# Patient Record
Sex: FEMALE | Race: WHITE | Employment: FULL TIME | URBAN - METROPOLITAN AREA
[De-identification: names, ages, dates, MRNs, and addresses within clinical notes are randomized per-mention and may not be internally consistent; named-entity substitution may affect disease eponyms.]

---

## 2017-12-14 RX ORDER — MEDROXYPROGESTERONE ACETATE 10 MG/1
TABLET ORAL
Qty: 28 TAB | Refills: 11 | Status: SHIPPED | OUTPATIENT
Start: 2017-12-14 | End: 2017-12-15 | Stop reason: SDUPTHER

## 2017-12-16 RX ORDER — MEDROXYPROGESTERONE ACETATE 10 MG/1
TABLET ORAL
Qty: 28 TAB | Refills: 11 | Status: SHIPPED | OUTPATIENT
Start: 2017-12-16

## 2023-02-28 ENCOUNTER — OFFICE VISIT (OUTPATIENT)
Dept: OBGYN CLINIC | Age: 38
End: 2023-02-28
Payer: COMMERCIAL

## 2023-02-28 VITALS
DIASTOLIC BLOOD PRESSURE: 81 MMHG | SYSTOLIC BLOOD PRESSURE: 117 MMHG | BODY MASS INDEX: 22.57 KG/M2 | WEIGHT: 127.4 LBS | HEIGHT: 63 IN

## 2023-02-28 DIAGNOSIS — N92.0 MENORRHAGIA WITH REGULAR CYCLE: ICD-10-CM

## 2023-02-28 DIAGNOSIS — N63.22 BREAST LUMP ON LEFT SIDE AT 10 O'CLOCK POSITION: Primary | ICD-10-CM

## 2023-02-28 DIAGNOSIS — N80.9 ENDOMETRIOSIS: ICD-10-CM

## 2023-02-28 PROCEDURE — 99203 OFFICE O/P NEW LOW 30 MIN: CPT | Performed by: OBSTETRICS & GYNECOLOGY

## 2023-02-28 RX ORDER — RELUGOLIX, ESTRADIOL HEMIHYDRATE, AND NORETHINDRONE ACETATE 40; 1; .5 MG/1; MG/1; MG/1
1 TABLET, FILM COATED ORAL DAILY
Qty: 30 EACH | Refills: 5 | Status: SHIPPED | OUTPATIENT
Start: 2023-02-28 | End: 2023-03-30

## 2023-02-28 RX ORDER — ALBUTEROL SULFATE 90 UG/1
AEROSOL, METERED RESPIRATORY (INHALATION)
COMMUNITY
Start: 2022-11-28

## 2023-02-28 NOTE — PROGRESS NOTES
Isaac Ramirez is a 40 y.o. female presents for a problem visit. Chief Complaint   Patient presents with    Breast Mass     Patient's last menstrual period was 02/13/2023. Birth Control: none. Last Pap: normal obtained 2021. The patient is reporting having: left Breast Mass for 1  week. She reports the symptoms are has worsened. 1. Have you been to the ER, urgent care clinic, or hospitalized since your last visit? N/A NP    2. Have you seen or consulted any other health care providers outside of the [address] [place] [address] since your last visit?   N/A NP     Examination chaperoned by Joselin Henry MA.

## 2023-02-28 NOTE — PROGRESS NOTES
Breast Lump Evaluation    Rosario Leon is a ,  40 y.o. female  whose Patient's last menstrual period was 2023. Alon Mcneil Reports a 1 week history of a left breast lump. She states it has been painful but is feeling better over past day or so. No recent injury. No nipple discharge. She has never had a mammogram.   No family history of breast cancer. Her other complaint is pelvic back and sciatica type pain she relates to endometriosis. She has had very heavy periods over years. She does have dyspareunia. Previous History:  Past Medical History:   Diagnosis Date    Eclampsia     Endometriosis 2011    Fibrocystic breast changes     Heavy periods     Pelvic adhesions      Past Surgical History:   Procedure Laterality Date    HX APPENDECTOMY      HX  SECTION  2005    Stat for pre eclampsia ? scar    HX  SECTION  2011    HX ORTHOPAEDIC      Cyst removed from hand    HX PELVIC LAPAROSCOPY      Endometriosis     Social History     Occupational History    Occupation:    Tobacco Use    Smoking status: Every Day     Packs/day: 0.25     Years: 10.00     Pack years: 2.50     Types: Cigarettes    Smokeless tobacco: Never   Substance and Sexual Activity    Alcohol use: Yes     Comment: Occasional    Drug use: No    Sexual activity: Yes     Partners: Male     Birth control/protection: Surgical     Comment: Vasectomy     Family History   Problem Relation Age of Onset    Diabetes Other     Hypertension Other     Prostate Cancer Father        Allergies   Allergen Reactions    Carbatrol [Carbamazepine] Hives    Phenytoin Sodium Extended Other (comments)     Bleeding gums, severe adverse side effects. Other reaction(s): Other (See Comments)  Raises blood pressure       Prior to Admission medications    Medication Sig Start Date End Date Taking?  Authorizing Provider   albuterol (PROVENTIL HFA, VENTOLIN HFA, PROAIR HFA) 90 mcg/actuation inhaler TAKE 2 PUFFS BY MOUTH EVERY 4 TO 6 HOURS AS NEEDED FOR BREATHING 11/28/22  Yes Provider, Historical        Review of Systems: History obtained from the patient  Constitutional: negative for weight loss, fever, night sweats  HEENT: negative for hearing loss, earache, congestion, snoring, sorethroat  CV: negative for chest pain, palpitations, edema  Resp: negative for cough, shortness of breath, wheezing  Breast: see above  GI: negative for change in bowel habits, abdominal pain, black or bloody stools  : negative for frequency, dysuria, hematuria, vaginal discharge  MSK: negative for back pain, joint pain, muscle pain  Skin: negative for itching, rash, hives  Neuro: negative for dizziness, headache, confusion, weakness  Psych: negative for anxiety, depression, change in mood  Heme/lymph: negative for bleeding, bruising, pallor        Objective:    Visit Vitals  /81   Ht 5' 3\" (1.6 m)   Wt 127 lb 6.4 oz (57.8 kg)   LMP 02/13/2023   BMI 22.57 kg/m²       Physical Exam:    Constitutional  Appearance: well-nourished, well developed, alert, in no acute distress    HENT  Head and Face: appears normal    Neck  Inspection/Palpation: normal appearance, no masses or tenderness  Lymph Nodes: no lymphadenopathy present  Thyroid: gland size normal, nontender, no nodules or masses present on palpation    Breasts  Inspection of Breasts: breasts symmetrical, no skin changes, no discharge present, nipple appearance normal, no skin retraction present  Palpation of Breasts and Axillae: severe fibrocystic changes present bilaterally, 2 cm masses present at 10 oclock on the left on palpation, no breast tenderness  Axillary Lymph Nodes: no lymphadenopathy present    Skin  General Inspection: no rash, no lesions identified    Neurologic/Psychiatric  Mental Status:  Orientation: grossly oriented to person, place and time  Mood and Affect: mood normal, affect appropriate    ASSESSMENT    ICD-10-CM ICD-9-CM    1.  Breast lump on left side at 10 o'clock position  N63.22 611.72 US BREAST LT COMPLETE 4 QUAD      ABHISHEK MAMMO BI DX INCL CAD      2. Endometriosis  N80.9 617.9           PLAN  Start [facility]  Discussed endometrial ablation. Schedule Annual  Return in about 1 month (around 3/28/2023).

## 2023-03-13 ENCOUNTER — HOSPITAL ENCOUNTER (OUTPATIENT)
Dept: MAMMOGRAPHY | Age: 38
Discharge: HOME | End: 2023-03-13
Attending: OBSTETRICS & GYNECOLOGY
Payer: COMMERCIAL

## 2023-03-13 DIAGNOSIS — N63.22 BREAST LUMP ON LEFT SIDE AT 10 O'CLOCK POSITION: ICD-10-CM

## 2023-03-13 PROCEDURE — 76642 ULTRASOUND BREAST LIMITED: CPT

## 2023-03-13 PROCEDURE — 77062 BREAST TOMOSYNTHESIS BI: CPT

## 2023-05-01 ENCOUNTER — OFFICE VISIT (OUTPATIENT)
Dept: OBGYN CLINIC | Age: 38
End: 2023-05-01
Payer: COMMERCIAL

## 2023-05-01 VITALS
WEIGHT: 129.6 LBS | BODY MASS INDEX: 22.96 KG/M2 | HEIGHT: 63 IN | SYSTOLIC BLOOD PRESSURE: 136 MMHG | DIASTOLIC BLOOD PRESSURE: 79 MMHG

## 2023-05-01 DIAGNOSIS — Z01.419 ROUTINE GYNECOLOGICAL EXAMINATION: Primary | ICD-10-CM

## 2023-05-01 DIAGNOSIS — N94.6 SEVERE DYSMENORRHEA: ICD-10-CM

## 2023-05-01 PROCEDURE — 99395 PREV VISIT EST AGE 18-39: CPT | Performed by: OBSTETRICS & GYNECOLOGY

## 2023-05-01 RX ORDER — IBUPROFEN 800 MG/1
800 TABLET ORAL
Qty: 60 TABLET | Refills: 3 | Status: SHIPPED | OUTPATIENT
Start: 2023-05-01 | End: 2023-05-31

## 2023-05-01 NOTE — PROGRESS NOTES
[address] [facility] 18-39    Philip Marr is a 45y.o. year old  [address] [facility] female   Patient's last menstrual period was 2023. She presents for her annual checkup. Has had trouble with periods being heavy   Having trouble with pelvic pain & symptoms of diarrhea during periods. And dyspareunia & dysmenorrhea. Did not take the MyFemBree. She had a mammogram & US for breast cyst on left still hurting. Patient's last menstrual period was 2023. Her periods are heavy in flow and usually regular with a 26-32 day interval with 3-7 day duration. Usually lasting about 3 days. Periods seem a little farther apart and are lasting longer. She has dysmenorrhea. Problems:  night sweats and having more anxiety. Birth Control: none. Last Pap: normal obtained 2 year(s) ago. She does not have a history of KATIE 2, 3 or cervical cancer. With regard to the Gardisil vaccine, she has not received it yet.     Medical History:  Patient Active Problem List   Diagnosis Code    Endometriosis N80.9    Menorrhagia with regular cycle N92.0    Breast lump on left side at 10 o'clock position N63.22       Past Medical History:   Diagnosis Date    Eclampsia     Endometriosis 2011    Fibrocystic breast changes     Heavy periods     Pelvic adhesions      Past Surgical History:   Procedure Laterality Date    HX APPENDECTOMY      HX  SECTION  2005    Stat for pre eclampsia ? scar    HX  SECTION  2011    HX ORTHOPAEDIC      Cyst removed from hand    HX PELVIC LAPAROSCOPY      Endometriosis     OB History    Para Term  AB Living   4 4 2 2 0 2   SAB IAB Ectopic Molar Multiple Live Births   0 0 0 0 0 2      # Outcome Date GA Lbr Berry/2nd Weight Sex Delivery Anes PTL Lv   4  11 31w1d  2 lb 10.3 oz (1.2 kg) F  LO EPIDURAL AN, SPINAL AN N JAKE      Name: Tash Fuad      Apgar1: 9  Apgar5: 9   3  05 32w0d  4 lb (1.814 kg) F CS-Classical Gen  JAKE      Birth Comments: Eclampsia; EDC 2/23/05   2 Term            1 Term              Social History     Socioeconomic History    Marital status:     Number of children: 2   Occupational History    Occupation:    Tobacco Use    Smoking status: Every Day     Packs/day: 0.25     Years: 10.00     Pack years: 2.50     Types: Cigarettes    Smokeless tobacco: Never   Substance and Sexual Activity    Alcohol use: Yes     Comment: Occasional    Drug use: No    Sexual activity: Yes     Partners: Male     Birth control/protection: Surgical     Comment: Vasectomy      Family History   Problem Relation Age of Onset    Diabetes Other     Hypertension Other     Prostate Cancer Father      Current Outpatient Medications on File Prior to Visit   Medication Sig Dispense Refill    albuterol (PROVENTIL HFA, VENTOLIN HFA, PROAIR HFA) 90 mcg/actuation inhaler TAKE 2 PUFFS BY MOUTH EVERY 4 TO 6 HOURS AS NEEDED FOR BREATHING       No current facility-administered medications on file prior to visit. Allergies   Allergen Reactions    Carbatrol [Carbamazepine] Hives    Phenytoin Sodium Extended Other (comments)     Bleeding gums, severe adverse side effects. Other reaction(s):  Other (See Comments)  Raises blood pressure         Review of Systems:  History obtained from the patient-negative for:  Constitutional: weight loss, fever, night sweats  HEENT: hearing loss, earache, congestion, snoring, sorethroat  CV: chest pain, palpitations, edema  Resp: cough, shortness of breath, wheezing  Breast: breast lumps, nipple discharge, galactorrhea  GI: change in bowel habits, abdominal pain, black or bloody stools  : frequency, dysuria, hematuria, vaginal discharge  MSK: back pain, joint pain, muscle pain  Skin: itching, rash, hives  Neuro: dizziness, headache, confusion, weakness  Psych: anxiety, depression, change in mood  Heme/lymph: bleeding, bruising, pallor    Physical Exam  Visit Vitals  /79   Ht 5' 3\" (1.6 m)   Wt 129 lb 9.6 oz (58.8 kg)   LMP 04/13/2023   BMI 22.96 kg/m²       Constitutional  Appearance: well-nourished, well developed, alert, in no acute distress    HENT  Head and Face: appears normal    Neck  Inspection/Palpation: normal appearance, no masses or tenderness  Lymph Nodes: no lymphadenopathy present  Thyroid: gland size normal, nontender, no nodules or masses present on palpation    Chest  Respiratory Effort: breathing unlabored  Auscultation: normal breath sounds    Cardiovascular  Heart:   Auscultation: regular rate and rhythm without murmur    Breasts  Inspection of Breasts: breasts symmetrical, no skin changes, no discharge present, nipple appearance normal, no skin retraction present  Palpation of Breasts and Axillae: no masses present on palpation, no breast tenderness Fcs and breast cysts  Axillary Lymph Nodes: no lymphadenopathy present    Gastrointestinal  Abdominal Examination: abdomen non-tender to palpation, normal bowel sounds, no masses present  Liver and spleen: no hepatomegaly present, spleen not palpable  Hernias: no hernias identified    Genitourinary  External Genitalia: normal appearance for age, no discharge present, no tenderness present, no inflammatory lesions present, no masses present, no atrophy present  Vagina: normal vaginal vault without central or paravaginal defects, no discharge present, no inflammatory lesions present, no masses present  Bladder: non-tender to palpation  Urethra: appears normal  Cervix: normal +CMT  Uterus: normal size, shape and consistency, retroverted, tender  Adnexa: no adnexal tenderness present, no adnexal masses present  Perineum: perineum within normal limits, no evidence of trauma, no rashes or skin lesions present  Anus: anus within normal limits, no hemorrhoids present  Inguinal Lymph Nodes: no lymphadenopathy present    Skin  General Inspection: no rash, no lesions identified    Neurologic/Psychiatric  Mental Status:  Orientation: grossly oriented to person, place and time  Mood and Affect: mood normal, affect appropriate    Labs:  No results found for this or any previous visit (from the past 12 hour(s)). Assessment:    ICD-10-CM ICD-9-CM    1. Routine gynecological examination  Z01.419 V72.31 PAP IG, APTIMA HPV AND RFX 16/18,45 ([id number])      2.  Severe dysmenorrhea  N94.6 625.3           Plan:  Counseled re: diet, exercise, healthy lifestyle  Rec screening mammo at 40  If pain persists will try myfembree  Return in about 1 year (around 5/1/2024) for Annual.

## 2023-05-06 LAB
CYTOLOGIST CVX/VAG CYTO: NORMAL
CYTOLOGY CVX/VAG DOC CYTO: NORMAL
CYTOLOGY CVX/VAG DOC THIN PREP: NORMAL
CYTOLOGY HISTORY:: NORMAL
DX ICD CODE: NORMAL
HPV GENOTYPE REFLEX: NORMAL
HPV I/H RISK 4 DNA CVX QL PROBE+SIG AMP: NEGATIVE
Lab: NORMAL
OTHER STN SPEC: NORMAL
STAT OF ADQ CVX/VAG CYTO-IMP: NORMAL